# Patient Record
Sex: FEMALE | Race: BLACK OR AFRICAN AMERICAN | Employment: UNEMPLOYED | URBAN - METROPOLITAN AREA
[De-identification: names, ages, dates, MRNs, and addresses within clinical notes are randomized per-mention and may not be internally consistent; named-entity substitution may affect disease eponyms.]

---

## 2017-11-03 ENCOUNTER — ANESTHESIA EVENT (OUTPATIENT)
Dept: OPERATING ROOM | Age: 57
End: 2017-11-03
Payer: COMMERCIAL

## 2017-11-06 ENCOUNTER — HOSPITAL ENCOUNTER (OUTPATIENT)
Age: 57
Setting detail: OUTPATIENT SURGERY
Discharge: HOME | End: 2017-11-06
Attending: SURGERY | Admitting: SURGERY
Payer: COMMERCIAL

## 2017-11-06 ENCOUNTER — ANESTHESIA (OUTPATIENT)
Dept: OPERATING ROOM | Age: 57
End: 2017-11-06
Payer: COMMERCIAL

## 2017-11-06 VITALS — OXYGEN SATURATION: 100 % | DIASTOLIC BLOOD PRESSURE: 59 MMHG | SYSTOLIC BLOOD PRESSURE: 100 MMHG

## 2017-11-06 VITALS
TEMPERATURE: 96.8 F | DIASTOLIC BLOOD PRESSURE: 66 MMHG | OXYGEN SATURATION: 100 % | HEART RATE: 56 BPM | SYSTOLIC BLOOD PRESSURE: 124 MMHG | RESPIRATION RATE: 15 BRPM | HEIGHT: 67 IN | WEIGHT: 245 LBS | BODY MASS INDEX: 38.45 KG/M2

## 2017-11-06 PROCEDURE — 7100000011 HC PHASE II RECOVERY - ADDTL 15 MIN: Performed by: SURGERY

## 2017-11-06 PROCEDURE — 3609010600 HC COLONOSCOPY POLYPECTOMY SNARE/COLD BIOPSY: Performed by: SURGERY

## 2017-11-06 PROCEDURE — 2580000003 HC RX 258: Performed by: ANESTHESIOLOGY

## 2017-11-06 PROCEDURE — 6360000002 HC RX W HCPCS: Performed by: NURSE ANESTHETIST, CERTIFIED REGISTERED

## 2017-11-06 PROCEDURE — 3700000000 HC ANESTHESIA ATTENDED CARE: Performed by: SURGERY

## 2017-11-06 PROCEDURE — 2500000003 HC RX 250 WO HCPCS: Performed by: NURSE ANESTHETIST, CERTIFIED REGISTERED

## 2017-11-06 PROCEDURE — 88305 TISSUE EXAM BY PATHOLOGIST: CPT

## 2017-11-06 PROCEDURE — 3700000001 HC ADD 15 MINUTES (ANESTHESIA): Performed by: SURGERY

## 2017-11-06 PROCEDURE — 7100000010 HC PHASE II RECOVERY - FIRST 15 MIN: Performed by: SURGERY

## 2017-11-06 RX ORDER — SODIUM CHLORIDE 0.9 % (FLUSH) 0.9 %
10 SYRINGE (ML) INJECTION PRN
Status: DISCONTINUED | OUTPATIENT
Start: 2017-11-06 | End: 2017-11-06 | Stop reason: HOSPADM

## 2017-11-06 RX ORDER — SODIUM CHLORIDE 9 MG/ML
INJECTION, SOLUTION INTRAVENOUS CONTINUOUS
Status: DISCONTINUED | OUTPATIENT
Start: 2017-11-06 | End: 2017-11-06

## 2017-11-06 RX ORDER — LIDOCAINE HYDROCHLORIDE 20 MG/ML
INJECTION, SOLUTION INFILTRATION; PERINEURAL PRN
Status: DISCONTINUED | OUTPATIENT
Start: 2017-11-06 | End: 2017-11-06 | Stop reason: SDUPTHER

## 2017-11-06 RX ORDER — PROPOFOL 10 MG/ML
INJECTION, EMULSION INTRAVENOUS CONTINUOUS PRN
Status: DISCONTINUED | OUTPATIENT
Start: 2017-11-06 | End: 2017-11-06 | Stop reason: SDUPTHER

## 2017-11-06 RX ORDER — SODIUM CHLORIDE, SODIUM LACTATE, POTASSIUM CHLORIDE, CALCIUM CHLORIDE 600; 310; 30; 20 MG/100ML; MG/100ML; MG/100ML; MG/100ML
INJECTION, SOLUTION INTRAVENOUS CONTINUOUS
Status: DISCONTINUED | OUTPATIENT
Start: 2017-11-06 | End: 2017-11-06 | Stop reason: HOSPADM

## 2017-11-06 RX ORDER — LIDOCAINE HYDROCHLORIDE 10 MG/ML
1 INJECTION, SOLUTION EPIDURAL; INFILTRATION; INTRACAUDAL; PERINEURAL
Status: DISCONTINUED | OUTPATIENT
Start: 2017-11-06 | End: 2017-11-06 | Stop reason: HOSPADM

## 2017-11-06 RX ORDER — KETAMINE HYDROCHLORIDE 100 MG/ML
INJECTION, SOLUTION INTRAMUSCULAR; INTRAVENOUS PRN
Status: DISCONTINUED | OUTPATIENT
Start: 2017-11-06 | End: 2017-11-06 | Stop reason: SDUPTHER

## 2017-11-06 RX ORDER — SODIUM CHLORIDE 0.9 % (FLUSH) 0.9 %
10 SYRINGE (ML) INJECTION EVERY 12 HOURS SCHEDULED
Status: DISCONTINUED | OUTPATIENT
Start: 2017-11-06 | End: 2017-11-06 | Stop reason: HOSPADM

## 2017-11-06 RX ADMIN — PROPOFOL 150 MCG/KG/MIN: 10 INJECTION, EMULSION INTRAVENOUS at 10:13

## 2017-11-06 RX ADMIN — KETAMINE HYDROCHLORIDE 10 MG: 100 INJECTION INTRAMUSCULAR; INTRAVENOUS at 10:20

## 2017-11-06 RX ADMIN — LIDOCAINE HYDROCHLORIDE 100 MG: 20 INJECTION, SOLUTION INFILTRATION; PERINEURAL at 10:14

## 2017-11-06 RX ADMIN — SODIUM CHLORIDE, POTASSIUM CHLORIDE, SODIUM LACTATE AND CALCIUM CHLORIDE: 600; 310; 30; 20 INJECTION, SOLUTION INTRAVENOUS at 09:20

## 2017-11-06 RX ADMIN — KETAMINE HYDROCHLORIDE 10 MG: 100 INJECTION INTRAMUSCULAR; INTRAVENOUS at 10:31

## 2017-11-06 ASSESSMENT — PAIN SCALES - GENERAL: PAINLEVEL_OUTOF10: 0

## 2017-11-06 ASSESSMENT — PAIN - FUNCTIONAL ASSESSMENT: PAIN_FUNCTIONAL_ASSESSMENT: 0-10

## 2017-11-06 NOTE — ANESTHESIA PRE PROCEDURE
Department of Anesthesiology  Preprocedure Note       Name:  Suzi Gillette   Age:  62 y.o.  :  1960                                          MRN:  [id number]         Date:  2017      Surgeon: Dale Banuelos):  Andrew Austin MD    Procedure: Procedure(s):  COLONOSCOPY    Medications prior to admission:   Prior to Admission medications    Medication Sig Start Date End Date Taking? Authorizing Provider   venlafaxine (EFFEXOR) 37.5 MG tablet Take 37.5 mg by mouth daily    Yes Historical Provider, MD   tamoxifen (NOLVADEX) 20 MG tablet Take 20 mg by mouth daily.    Yes Historical Provider, MD       Current medications:    Current Facility-Administered Medications   Medication Dose Route Frequency Provider Last Rate Last Dose    lactated ringers infusion   Intravenous Continuous Tuan Leslie  mL/hr at 17 0920      sodium chloride flush 0.9 % injection 10 mL  10 mL Intravenous 2 times per day Tuan Leslie MD        sodium chloride flush 0.9 % injection 10 mL  10 mL Intravenous PRN Tuan Leslie MD        lidocaine PF 1 % injection 1 mL  1 mL Intradermal Once PRN Tuan Leslie MD           Allergies:  No Known Allergies    Problem List:    Patient Active Problem List   Diagnosis Code    Colon polyps K63.5       Past Medical History:        Diagnosis Date    Cancer ([facility] [address]     elaine breast    PONV (postoperative nausea and vomiting)        Past Surgical History:        Procedure Laterality Date    BREAST SURGERY Bilateral     lupectomy    COLONOSCOPY  12/3/2014    multiple polyps    HYSTERECTOMY         Social History:    Social History   Substance Use Topics    Smoking status: Never Smoker    Smokeless tobacco: Never Used    Alcohol use No                                Counseling given: Not Answered      Vital Signs (Current):   Vitals:    17 0854 17 0855   BP:  128/88   Pulse:  71   Resp:  16   Temp:  98.4 °F (36.9 °C)   TempSrc:  Oral   SpO2:  98%   Weight: 245 lb (111.1 kg)    Height: 5' 6.5\" (1.689 m)                                               BP Readings from Last 3 Encounters:   11/06/17 128/88   12/03/14 126/57       NPO Status: Time of last liquid consumption: 0730                        Time of last solid consumption: 1300                        Date of last liquid consumption: 11/05/17 (16 oz of lemon water)                        Date of last solid food consumption: 11/05/17    BMI:   Wt Readings from Last 3 Encounters:   11/06/17 245 lb (111.1 kg)   12/03/14 231 lb 14.8 oz (105.2 kg)     Body mass index is 38.95 kg/m². CBC:   Lab Results   Component Value Date    WBC 5.4 12/07/2011    RBC 4.49 12/07/2011    HGB 14.2 12/07/2011    HCT 40.2 12/07/2011    MCV 89.6 12/07/2011    RDW 12.9 12/07/2011     12/07/2011       CMP: No results found for: NA, K, CL, CO2, BUN, CREATININE, GFRAA, AGRATIO, LABGLOM, GLUCOSE, PROT, CALCIUM, BILITOT, ALKPHOS, AST, ALT    POC Tests: No results for input(s): POCGLU, POCNA, POCK, POCCL, POCBUN, POCHEMO, POCHCT in the last 72 hours. Coags: No results found for: PROTIME, INR, APTT    HCG (If Applicable): No results found for: PREGTESTUR, PREGSERUM, HCG, HCGQUANT     ABGs: No results found for: PHART, PO2ART, HWQ4QYE, LFT5CRG, BEART, B8VDOIFI     Type & Screen (If Applicable):  No results found for: LABABO, [address]    Anesthesia Evaluation  Patient summary reviewed and Nursing notes reviewed   history of anesthetic complications: PONV.   Airway: Mallampati: II  TM distance: >3 FB   Neck ROM: full  Mouth opening: > = 3 FB Dental: normal exam         Pulmonary:normal exam        (-) COPD and asthma                           Cardiovascular:  Exercise tolerance: good (>4 METS),       (-) pacemaker, hypertension, past MI and CAD      Rhythm: regular  Rate: normal           Beta Blocker:  Not on Beta Blocker         Neuro/Psych:      (-) seizures, TIA and CVA           GI/Hepatic/Renal:        (-) GERD       Endo/Other:

## 2017-11-06 NOTE — ANESTHESIA POSTPROCEDURE EVALUATION
Department of Anesthesiology  Postprocedure Note    Patient: Suzi Shelley  MRN: [id number]  YOB: 1960  Date of evaluation: 11/6/2017  Time:  3:03 PM     Procedure Summary     Date:  11/06/17 Room / Location:  [facility] / [facility] OR    Anesthesia Start:  1009 Anesthesia Stop:  8052    Procedure:  COLONOSCOPY POLYPECTOMY SNARE/COLD BIOPSY Diagnosis:  (HX COLON POLYPS)    Surgeon:  Dave Haney MD Responsible Provider:  Francisco Burns DO    Anesthesia Type:  MAC, general ASA Status:  3          Anesthesia Type: No value filed. Atiya Phase I:      Atiya Phase II: Atiya Score: 8    Last vitals: Reviewed and per EMR flowsheets.        Anesthesia Post Evaluation    Patient location during evaluation: PACU  Patient participation: complete - patient participated  Level of consciousness: awake and alert  Airway patency: patent  Nausea & Vomiting: no nausea and no vomiting  Complications: no  Cardiovascular status: hemodynamically stable  Respiratory status: acceptable  Hydration status: stable

## 2017-11-06 NOTE — BRIEF OP NOTE
Brief Postoperative Note  ______________________________________________________________    Patient: Suzi Gillette  YOB: 1960  MRN: [id number]  Date of Procedure: 11/6/2017    Pre-Op Diagnosis: HX COLON POLYPS    Post-Op Diagnosis: Same       Procedure(s):  COLONOSCOPY POLYPECTOMY SNARE/COLD BIOPSY    Anesthesia: * No anesthesia type entered *    Surgeon(s):  Mohinder Dang MD    Staff:  [employer]: Lucia Manzanares     Estimated Blood Loss: * No values recorded between 11/6/2017 10:09 AM and 11/6/2017 28:02 AM *    Complications: None    Specimens:   ID Type Source Tests Collected by Time Destination   A : POLYP AT 38 CM Tissue Colon SURGICAL PATHOLOGY Mohinder Dang MD 11/6/2017 1020    B : BIOPSY CECUM Tissue Colon SURGICAL PATHOLOGY Mohinder Dang MD 11/6/2017 1026    C : [address], MD 11/6/2017 1033        Implants:  * No implants in log *      Drains:      Findings: hemorrhoids, colon polyps    Mohinder Dang MD  Date: 11/6/2017  Time: 10:47 AM

## 2017-11-07 LAB — SURGICAL PATHOLOGY REPORT: NORMAL

## 2017-12-18 ENCOUNTER — HOSPITAL ENCOUNTER (OUTPATIENT)
Dept: WOMENS IMAGING | Age: 57
Discharge: HOME | End: 2017-12-18
Payer: COMMERCIAL

## 2017-12-18 VITALS — DIASTOLIC BLOOD PRESSURE: 60 MMHG | SYSTOLIC BLOOD PRESSURE: 110 MMHG | HEART RATE: 59 BPM

## 2017-12-18 DIAGNOSIS — R92.8 ABNORMAL MAMMOGRAM: ICD-10-CM

## 2017-12-18 PROCEDURE — G0206 DX MAMMO INCL CAD UNI: HCPCS

## 2017-12-18 PROCEDURE — 88305 TISSUE EXAM BY PATHOLOGIST: CPT

## 2017-12-18 PROCEDURE — 19081 BX BREAST 1ST LESION STRTCTC: CPT

## 2017-12-19 LAB — SURGICAL PATHOLOGY REPORT: NORMAL

## 2017-12-19 NOTE — OP NOTE
[address]                   [address] [facility], [address]                                 OPERATIVE REPORT    PATIENT NAME: Maureen Quinn                       :        1960  MED REC NO:   [id number]                              ROOM:  ACCOUNT NO:   [de-identified]                           ADMIT DATE: 2017  PROVIDER:     Kaylee Lozano    DATE OF PROCEDURE:  2017    PREPROCEDURE DIAGNOSIS:  Abnormal right mammogram with microcalcification. POSTPROCEDURE DIAGNOSIS:  Abnormal right mammogram with microcalcification. PROCEDURE PERFORMED:  Stereotactic excision of microcalcifications right  breast.    SURGEON:  Kaylee Lozano MD    ANESTHESIA:  Local.    INDICATIONS:  This 66-year-old -American lady who previously had a  history of breast cancer and now has an abnormal right mammogram with  cluster and microcalcification. She is brought in now for biopsy of the  cluster and calcification. FINDINGS AND DESCRIPTION OF PROCEDURE:  After she was positioned on a [facility]  biopsy table, we obtained mammographic images that showed cluster and  calcifications in the right lower outer quadrant. A 9-gauge biopsy needle  was directed to the site of the cluster and calcifications and a total of  12 excisional bites were taken from that area. Postprocedure film  demonstrated a clip had been placed at the site of the prior  calcifications, there were no obvious calcifications seen at that site. She will be followed up in my office to discuss the result of the  pathology.         Gretchen Fernandez    D: 2017 14:17:49       T: 2017 0:43:53     [email or web address]  Job#: [id number]     Doc#: [id number]    CC:  Kaylee Lozano

## 2023-08-07 ENCOUNTER — ANESTHESIA EVENT (OUTPATIENT)
Dept: OPERATING ROOM | Age: 63
End: 2023-08-07
Payer: COMMERCIAL

## 2023-08-07 ENCOUNTER — HOSPITAL ENCOUNTER (OUTPATIENT)
Age: 63
Setting detail: OUTPATIENT SURGERY
Discharge: HOME | End: 2023-08-07
Attending: SURGERY | Admitting: SURGERY
Payer: COMMERCIAL

## 2023-08-07 ENCOUNTER — ANESTHESIA (OUTPATIENT)
Dept: OPERATING ROOM | Age: 63
End: 2023-08-07
Payer: COMMERCIAL

## 2023-08-07 VITALS
BODY MASS INDEX: 39.45 KG/M2 | DIASTOLIC BLOOD PRESSURE: 72 MMHG | TEMPERATURE: 97.2 F | SYSTOLIC BLOOD PRESSURE: 130 MMHG | HEART RATE: 60 BPM | OXYGEN SATURATION: 98 % | RESPIRATION RATE: 11 BRPM | WEIGHT: 251.32 LBS | HEIGHT: 67 IN

## 2023-08-07 DIAGNOSIS — Z12.11 COLON CANCER SCREENING: ICD-10-CM

## 2023-08-07 PROCEDURE — 88341 IMHCHEM/IMCYTCHM EA ADD ANTB: CPT

## 2023-08-07 PROCEDURE — 7100000011 HC PHASE II RECOVERY - ADDTL 15 MIN: Performed by: SURGERY

## 2023-08-07 PROCEDURE — 2580000003 HC RX 258: Performed by: NURSE ANESTHETIST, CERTIFIED REGISTERED

## 2023-08-07 PROCEDURE — 6360000002 HC RX W HCPCS: Performed by: NURSE ANESTHETIST, CERTIFIED REGISTERED

## 2023-08-07 PROCEDURE — 3609010400 HC COLONOSCOPY POLYPECTOMY HOT BIOPSY: Performed by: SURGERY

## 2023-08-07 PROCEDURE — 7100000010 HC PHASE II RECOVERY - FIRST 15 MIN: Performed by: SURGERY

## 2023-08-07 PROCEDURE — 3700000001 HC ADD 15 MINUTES (ANESTHESIA): Performed by: SURGERY

## 2023-08-07 PROCEDURE — 3700000000 HC ANESTHESIA ATTENDED CARE: Performed by: SURGERY

## 2023-08-07 PROCEDURE — 88342 IMHCHEM/IMCYTCHM 1ST ANTB: CPT

## 2023-08-07 PROCEDURE — 2500000003 HC RX 250 WO HCPCS: Performed by: NURSE ANESTHETIST, CERTIFIED REGISTERED

## 2023-08-07 PROCEDURE — 88360 TUMOR IMMUNOHISTOCHEM/MANUAL: CPT

## 2023-08-07 PROCEDURE — 2709999900 HC NON-CHARGEABLE SUPPLY: Performed by: SURGERY

## 2023-08-07 PROCEDURE — 88305 TISSUE EXAM BY PATHOLOGIST: CPT

## 2023-08-07 RX ORDER — SODIUM CHLORIDE, SODIUM LACTATE, POTASSIUM CHLORIDE, CALCIUM CHLORIDE 600; 310; 30; 20 MG/100ML; MG/100ML; MG/100ML; MG/100ML
INJECTION, SOLUTION INTRAVENOUS CONTINUOUS PRN
Status: DISCONTINUED | OUTPATIENT
Start: 2023-08-07 | End: 2023-08-07 | Stop reason: SDUPTHER

## 2023-08-07 RX ORDER — MIDAZOLAM HYDROCHLORIDE 1 MG/ML
INJECTION INTRAMUSCULAR; INTRAVENOUS PRN
Status: DISCONTINUED | OUTPATIENT
Start: 2023-08-07 | End: 2023-08-07 | Stop reason: SDUPTHER

## 2023-08-07 RX ORDER — LIDOCAINE HYDROCHLORIDE 20 MG/ML
INJECTION, SOLUTION EPIDURAL; INFILTRATION; INTRACAUDAL; PERINEURAL PRN
Status: DISCONTINUED | OUTPATIENT
Start: 2023-08-07 | End: 2023-08-07 | Stop reason: SDUPTHER

## 2023-08-07 RX ORDER — PROPOFOL 10 MG/ML
INJECTION, EMULSION INTRAVENOUS PRN
Status: DISCONTINUED | OUTPATIENT
Start: 2023-08-07 | End: 2023-08-07 | Stop reason: SDUPTHER

## 2023-08-07 RX ADMIN — PROPOFOL 30 MG: 10 INJECTION, EMULSION INTRAVENOUS at 12:04

## 2023-08-07 RX ADMIN — PROPOFOL 50 MG: 10 INJECTION, EMULSION INTRAVENOUS at 11:50

## 2023-08-07 RX ADMIN — PROPOFOL 50 MG: 10 INJECTION, EMULSION INTRAVENOUS at 11:52

## 2023-08-07 RX ADMIN — PROPOFOL 30 MG: 10 INJECTION, EMULSION INTRAVENOUS at 11:58

## 2023-08-07 RX ADMIN — SODIUM CHLORIDE, POTASSIUM CHLORIDE, SODIUM LACTATE AND CALCIUM CHLORIDE: 600; 310; 30; 20 INJECTION, SOLUTION INTRAVENOUS at 11:48

## 2023-08-07 RX ADMIN — LIDOCAINE HYDROCHLORIDE 100 MG: 20 INJECTION, SOLUTION EPIDURAL; INFILTRATION; INTRACAUDAL; PERINEURAL at 11:50

## 2023-08-07 RX ADMIN — MIDAZOLAM 1 MG: 1 INJECTION INTRAMUSCULAR; INTRAVENOUS at 11:53

## 2023-08-07 RX ADMIN — PROPOFOL 50 MG: 10 INJECTION, EMULSION INTRAVENOUS at 12:09

## 2023-08-07 RX ADMIN — PROPOFOL 50 MG: 10 INJECTION, EMULSION INTRAVENOUS at 11:55

## 2023-08-07 RX ADMIN — PROPOFOL 30 MG: 10 INJECTION, EMULSION INTRAVENOUS at 12:01

## 2023-08-07 RX ADMIN — PROPOFOL 20 MG: 10 INJECTION, EMULSION INTRAVENOUS at 12:18

## 2023-08-07 RX ADMIN — PROPOFOL 20 MG: 10 INJECTION, EMULSION INTRAVENOUS at 12:12

## 2023-08-07 RX ADMIN — MIDAZOLAM 1 MG: 1 INJECTION INTRAMUSCULAR; INTRAVENOUS at 11:58

## 2023-08-07 RX ADMIN — PROPOFOL 20 MG: 10 INJECTION, EMULSION INTRAVENOUS at 11:53

## 2023-08-07 RX ADMIN — PROPOFOL 30 MG: 10 INJECTION, EMULSION INTRAVENOUS at 12:07

## 2023-08-07 RX ADMIN — PROPOFOL 20 MG: 10 INJECTION, EMULSION INTRAVENOUS at 12:22

## 2023-08-07 RX ADMIN — PROPOFOL 20 MG: 10 INJECTION, EMULSION INTRAVENOUS at 12:20

## 2023-08-07 RX ADMIN — PROPOFOL 50 MG: 10 INJECTION, EMULSION INTRAVENOUS at 11:51

## 2023-08-07 RX ADMIN — PROPOFOL 20 MG: 10 INJECTION, EMULSION INTRAVENOUS at 12:16

## 2023-08-07 RX ADMIN — PROPOFOL 20 MG: 10 INJECTION, EMULSION INTRAVENOUS at 12:14

## 2023-08-07 ASSESSMENT — PAIN SCALES - GENERAL
PAINLEVEL_OUTOF10: 0
PAINLEVEL_OUTOF10: 0

## 2023-08-07 ASSESSMENT — PAIN - FUNCTIONAL ASSESSMENT: PAIN_FUNCTIONAL_ASSESSMENT: 0-10

## 2023-08-07 NOTE — ANESTHESIA POSTPROCEDURE EVALUATION
Department of Anesthesiology  Postprocedure Note    Patient: Suzi Glez  MRN: [id number]  YOB: 1960  Date of evaluation: 8/7/2023      Procedure Summary     Date: 08/07/23 Room / Location: [place] [address] / [facility] - INPATIENT    Anesthesia Start: 1148 Anesthesia Stop: 1233    Procedure: COLONOSCOPY POLYPECTOMY HOT BIOPSY Diagnosis:       Colon cancer screening      (Colon cancer screening [Z12.11])    Surgeons: Yohannes Gilbert MD Responsible Provider: Lavern Blanchard MD    Anesthesia Type: MAC ASA Status: 2          Anesthesia Type: MAC    Atiya Phase I:      Atiya Phase II: Atiya Score: 10      Anesthesia Post Evaluation    Complications: no

## 2023-08-07 NOTE — BRIEF OP NOTE
Brief Postoperative Note      Patient: Suzi Gillette  YOB: 1960  MRN: [id number]    Date of Procedure: 8/7/2023    Pre-Op Diagnosis Codes:     * Colon cancer screening [Z12.11]    Post-Op Diagnosis:  colon polyps       Procedure(s):  COLONOSCOPY POLYPECTOMY HOT BIOPSY    Surgeon(s):  Anderson Rivers MD    Assistant:  * No surgical staff found *    Anesthesia: Monitor Anesthesia Care    Estimated Blood Loss (mL): Minimal    Complications: None    Specimens:   ID Type Source Tests Collected by Time Destination   A : [address], MD 8/7/2023 1158    B : [address] MD 8/7/2023 1206    C : COLON POLYP  Nimo Spear MD 8/7/2023 1212    D : POLYP  Nimo Spear MD 8/7/2023 1218        Implants:  * No implants in log *      Drains: * No LDAs found *    Findings: Polyp on ileocecal valve; polyp at 45 cms; hemorrhoids      Electronically signed by Anderson Rivers MD on 8/7/2023 at 12:45 PM

## 2023-08-09 LAB — SURGICAL PATHOLOGY REPORT: NORMAL

## (undated) DEVICE — FORCEPS BX L L240CM DIA2.4MM RAD JAW 4 HOT FOR POLYP DISP

## (undated) DEVICE — MEDICINE CUP, GRADUATED, STER: Brand: MEDLINE

## (undated) DEVICE — SYRINGE MED 50ML LUERLOCK TIP

## (undated) DEVICE — GLOVE SURG SZ 8 L12IN FNGR THK87MIL WHT LTX FREE

## (undated) DEVICE — ADAPTER TBNG LUER STUB 15 GA INTMED

## (undated) DEVICE — BASIN EMESIS 500CC ROSE 250/CS 60/PLT: Brand: MEDEGEN MEDICAL PRODUCTS, LLC

## (undated) DEVICE — FORCEPS BX L240CM JAW DIA2.4MM ORNG L CAP W/ NDL DISP RAD

## (undated) DEVICE — GOWN,AURORA,NONREINFORCED,LARGE: Brand: MEDLINE

## (undated) DEVICE — 60 ML SYRINGE LUER-LOCK TIP: Brand: MONOJECT

## (undated) DEVICE — NO USE 18 MONTHS GOWN ISOL XL YEL LF